# Patient Record
Sex: FEMALE | Race: WHITE | NOT HISPANIC OR LATINO | URBAN - METROPOLITAN AREA
[De-identification: names, ages, dates, MRNs, and addresses within clinical notes are randomized per-mention and may not be internally consistent; named-entity substitution may affect disease eponyms.]

---

## 2019-01-09 ENCOUNTER — INPATIENT (INPATIENT)
Facility: HOSPITAL | Age: 34
LOS: 3 days | Discharge: HOME | End: 2019-01-13
Attending: OBSTETRICS & GYNECOLOGY | Admitting: OBSTETRICS & GYNECOLOGY
Payer: COMMERCIAL

## 2019-01-09 VITALS — HEIGHT: 66 IN | WEIGHT: 166.89 LBS

## 2019-01-09 DIAGNOSIS — Z3A.00 WEEKS OF GESTATION OF PREGNANCY NOT SPECIFIED: ICD-10-CM

## 2019-01-09 DIAGNOSIS — O26.899 OTHER SPECIFIED PREGNANCY RELATED CONDITIONS, UNSPECIFIED TRIMESTER: ICD-10-CM

## 2019-01-09 LAB
ALBUMIN SERPL ELPH-MCNC: 3.6 G/DL — SIGNIFICANT CHANGE UP (ref 3.3–5)
ALP SERPL-CCNC: 88 U/L — SIGNIFICANT CHANGE UP (ref 40–120)
ALT FLD-CCNC: 35 U/L — SIGNIFICANT CHANGE UP (ref 10–45)
ANION GAP SERPL CALC-SCNC: 20 MMOL/L — HIGH (ref 5–17)
APPEARANCE UR: CLEAR — SIGNIFICANT CHANGE UP
APTT BLD: 24.8 SEC — LOW (ref 27.5–36.3)
AST SERPL-CCNC: 32 U/L — SIGNIFICANT CHANGE UP (ref 10–40)
BASOPHILS NFR BLD AUTO: 0.1 % — SIGNIFICANT CHANGE UP (ref 0–2)
BILIRUB SERPL-MCNC: 0.3 MG/DL — SIGNIFICANT CHANGE UP (ref 0.2–1.2)
BILIRUB UR-MCNC: NEGATIVE — SIGNIFICANT CHANGE UP
BLD GP AB SCN SERPL QL: POSITIVE — SIGNIFICANT CHANGE UP
BUN SERPL-MCNC: 8 MG/DL — SIGNIFICANT CHANGE UP (ref 7–23)
CALCIUM SERPL-MCNC: 8.7 MG/DL — SIGNIFICANT CHANGE UP (ref 8.4–10.5)
CHLORIDE SERPL-SCNC: 95 MMOL/L — LOW (ref 96–108)
CO2 SERPL-SCNC: 17 MMOL/L — LOW (ref 22–31)
COLOR SPEC: YELLOW — SIGNIFICANT CHANGE UP
CREAT ?TM UR-MCNC: 62 MG/DL — SIGNIFICANT CHANGE UP
CREAT SERPL-MCNC: 0.72 MG/DL — SIGNIFICANT CHANGE UP (ref 0.5–1.3)
DIFF PNL FLD: NEGATIVE — SIGNIFICANT CHANGE UP
EOSINOPHIL NFR BLD AUTO: 0.4 % — SIGNIFICANT CHANGE UP (ref 0–6)
FIBRINOGEN PPP-MCNC: 399 MG/DL — SIGNIFICANT CHANGE UP (ref 258–438)
GLUCOSE SERPL-MCNC: 73 MG/DL — SIGNIFICANT CHANGE UP (ref 70–99)
GLUCOSE UR QL: NEGATIVE — SIGNIFICANT CHANGE UP
HCT VFR BLD CALC: 38.4 % — SIGNIFICANT CHANGE UP (ref 34.5–45)
HGB BLD-MCNC: 13.3 G/DL — SIGNIFICANT CHANGE UP (ref 11.5–15.5)
INR BLD: 0.91 — SIGNIFICANT CHANGE UP (ref 0.88–1.16)
KETONES UR-MCNC: ABNORMAL MG/DL
LDH SERPL L TO P-CCNC: 237 U/L — SIGNIFICANT CHANGE UP (ref 50–242)
LEUKOCYTE ESTERASE UR-ACNC: ABNORMAL
LYMPHOCYTES # BLD AUTO: 16.6 % — SIGNIFICANT CHANGE UP (ref 13–44)
MCHC RBC-ENTMCNC: 32.3 PG — SIGNIFICANT CHANGE UP (ref 27–34)
MCHC RBC-ENTMCNC: 34.6 G/DL — SIGNIFICANT CHANGE UP (ref 32–36)
MCV RBC AUTO: 93.2 FL — SIGNIFICANT CHANGE UP (ref 80–100)
MONOCYTES NFR BLD AUTO: 7.3 % — SIGNIFICANT CHANGE UP (ref 2–14)
NEUTROPHILS NFR BLD AUTO: 75.6 % — SIGNIFICANT CHANGE UP (ref 43–77)
NITRITE UR-MCNC: NEGATIVE — SIGNIFICANT CHANGE UP
NT-PROBNP SERPL-SCNC: 17 PG/ML — SIGNIFICANT CHANGE UP (ref 0–300)
PH UR: 6.5 — SIGNIFICANT CHANGE UP (ref 5–8)
PLATELET # BLD AUTO: 193 K/UL — SIGNIFICANT CHANGE UP (ref 150–400)
POTASSIUM SERPL-MCNC: 3.4 MMOL/L — LOW (ref 3.5–5.3)
POTASSIUM SERPL-SCNC: 3.4 MMOL/L — LOW (ref 3.5–5.3)
PROT ?TM UR-MCNC: 5 MG/DL — SIGNIFICANT CHANGE UP (ref 0–12)
PROT SERPL-MCNC: 6.5 G/DL — SIGNIFICANT CHANGE UP (ref 6–8.3)
PROT UR-MCNC: NEGATIVE MG/DL — SIGNIFICANT CHANGE UP
PROT/CREAT UR-RTO: 0.1 RATIO — SIGNIFICANT CHANGE UP (ref 0–0.2)
PROTHROM AB SERPL-ACNC: 10.3 SEC — SIGNIFICANT CHANGE UP (ref 10–12.9)
RBC # BLD: 4.12 M/UL — SIGNIFICANT CHANGE UP (ref 3.8–5.2)
RBC # FLD: 12.7 % — SIGNIFICANT CHANGE UP (ref 10.3–16.9)
RH IG SCN BLD-IMP: NEGATIVE — SIGNIFICANT CHANGE UP
RH IG SCN BLD-IMP: NEGATIVE — SIGNIFICANT CHANGE UP
SODIUM SERPL-SCNC: 132 MMOL/L — LOW (ref 135–145)
SP GR SPEC: <=1.005 — SIGNIFICANT CHANGE UP (ref 1–1.03)
URATE SERPL-MCNC: 5.7 MG/DL — SIGNIFICANT CHANGE UP (ref 2.5–7)
UROBILINOGEN FLD QL: 0.2 E.U./DL — SIGNIFICANT CHANGE UP
WBC # BLD: 13.4 K/UL — HIGH (ref 3.8–10.5)
WBC # FLD AUTO: 13.4 K/UL — HIGH (ref 3.8–10.5)

## 2019-01-09 PROCEDURE — 86077 PHYS BLOOD BANK SERV XMATCH: CPT

## 2019-01-09 RX ORDER — SODIUM CHLORIDE 9 MG/ML
1000 INJECTION, SOLUTION INTRAVENOUS ONCE
Qty: 0 | Refills: 0 | Status: DISCONTINUED | OUTPATIENT
Start: 2019-01-09 | End: 2019-01-11

## 2019-01-09 RX ORDER — FENTANYL/BUPIVACAINE/NS/PF 2MCG/ML-.1
250 PLASTIC BAG, INJECTION (ML) INJECTION
Qty: 0 | Refills: 0 | Status: DISCONTINUED | OUTPATIENT
Start: 2019-01-09 | End: 2019-01-11

## 2019-01-09 RX ORDER — SODIUM CHLORIDE 9 MG/ML
1000 INJECTION, SOLUTION INTRAVENOUS
Qty: 0 | Refills: 0 | Status: COMPLETED | OUTPATIENT
Start: 2019-01-09 | End: 2019-01-10

## 2019-01-09 RX ORDER — OXYTOCIN 10 UNIT/ML
333.33 VIAL (ML) INJECTION
Qty: 20 | Refills: 0 | Status: DISCONTINUED | OUTPATIENT
Start: 2019-01-09 | End: 2019-01-11

## 2019-01-09 RX ORDER — DINOPROSTONE 10 MG/241MG
10 INSERT VAGINAL ONCE
Qty: 0 | Refills: 0 | Status: COMPLETED | OUTPATIENT
Start: 2019-01-09 | End: 2019-01-09

## 2019-01-09 RX ADMIN — SODIUM CHLORIDE 125 MILLILITER(S): 9 INJECTION, SOLUTION INTRAVENOUS at 15:06

## 2019-01-09 RX ADMIN — DINOPROSTONE 10 MILLIGRAM(S): 10 INSERT VAGINAL at 15:49

## 2019-01-10 LAB
ALBUMIN SERPL ELPH-MCNC: 3.2 G/DL — LOW (ref 3.3–5)
ALP SERPL-CCNC: 81 U/L — SIGNIFICANT CHANGE UP (ref 40–120)
ALT FLD-CCNC: 27 U/L — SIGNIFICANT CHANGE UP (ref 10–45)
ANION GAP SERPL CALC-SCNC: 18 MMOL/L — HIGH (ref 5–17)
AST SERPL-CCNC: 31 U/L — SIGNIFICANT CHANGE UP (ref 10–40)
BILIRUB SERPL-MCNC: 0.6 MG/DL — SIGNIFICANT CHANGE UP (ref 0.2–1.2)
BUN SERPL-MCNC: 6 MG/DL — LOW (ref 7–23)
CALCIUM SERPL-MCNC: 8.7 MG/DL — SIGNIFICANT CHANGE UP (ref 8.4–10.5)
CHLORIDE SERPL-SCNC: 93 MMOL/L — LOW (ref 96–108)
CO2 SERPL-SCNC: 19 MMOL/L — LOW (ref 22–31)
CREAT SERPL-MCNC: 0.72 MG/DL — SIGNIFICANT CHANGE UP (ref 0.5–1.3)
GLUCOSE SERPL-MCNC: 80 MG/DL — SIGNIFICANT CHANGE UP (ref 70–99)
HCT VFR BLD CALC: 36.1 % — SIGNIFICANT CHANGE UP (ref 34.5–45)
HGB BLD-MCNC: 12.9 G/DL — SIGNIFICANT CHANGE UP (ref 11.5–15.5)
MCHC RBC-ENTMCNC: 33.2 PG — SIGNIFICANT CHANGE UP (ref 27–34)
MCHC RBC-ENTMCNC: 35.7 G/DL — SIGNIFICANT CHANGE UP (ref 32–36)
MCV RBC AUTO: 93 FL — SIGNIFICANT CHANGE UP (ref 80–100)
NT-PROBNP SERPL-SCNC: 52 PG/ML — SIGNIFICANT CHANGE UP (ref 0–300)
PLATELET # BLD AUTO: 184 K/UL — SIGNIFICANT CHANGE UP (ref 150–400)
POTASSIUM SERPL-MCNC: 3.5 MMOL/L — SIGNIFICANT CHANGE UP (ref 3.5–5.3)
POTASSIUM SERPL-SCNC: 3.5 MMOL/L — SIGNIFICANT CHANGE UP (ref 3.5–5.3)
PROT SERPL-MCNC: 5.9 G/DL — LOW (ref 6–8.3)
RBC # BLD: 3.88 M/UL — SIGNIFICANT CHANGE UP (ref 3.8–5.2)
RBC # FLD: 12.7 % — SIGNIFICANT CHANGE UP (ref 10.3–16.9)
SODIUM SERPL-SCNC: 130 MMOL/L — LOW (ref 135–145)
T PALLIDUM AB TITR SER: NEGATIVE — SIGNIFICANT CHANGE UP
URATE SERPL-MCNC: 5 MG/DL — SIGNIFICANT CHANGE UP (ref 2.5–7)
WBC # BLD: 18.5 K/UL — HIGH (ref 3.8–10.5)
WBC # FLD AUTO: 18.5 K/UL — HIGH (ref 3.8–10.5)

## 2019-01-10 RX ORDER — OXYTOCIN 10 UNIT/ML
1 VIAL (ML) INJECTION
Qty: 30 | Refills: 0 | Status: DISCONTINUED | OUTPATIENT
Start: 2019-01-10 | End: 2019-01-11

## 2019-01-10 RX ADMIN — Medication 1 MILLIUNIT(S)/MIN: at 09:36

## 2019-01-10 RX ADMIN — SODIUM CHLORIDE 125 MILLILITER(S): 9 INJECTION, SOLUTION INTRAVENOUS at 20:10

## 2019-01-10 RX ADMIN — SODIUM CHLORIDE 125 MILLILITER(S): 9 INJECTION, SOLUTION INTRAVENOUS at 11:14

## 2019-01-11 ENCOUNTER — RESULT REVIEW (OUTPATIENT)
Age: 34
End: 2019-01-11

## 2019-01-11 ENCOUNTER — TRANSCRIPTION ENCOUNTER (OUTPATIENT)
Age: 34
End: 2019-01-11

## 2019-01-11 LAB
ANION GAP SERPL CALC-SCNC: 15 MMOL/L — SIGNIFICANT CHANGE UP (ref 5–17)
BLD GP AB SCN SERPL QL: POSITIVE — SIGNIFICANT CHANGE UP
BUN SERPL-MCNC: 5 MG/DL — LOW (ref 7–23)
CALCIUM SERPL-MCNC: 8.4 MG/DL — SIGNIFICANT CHANGE UP (ref 8.4–10.5)
CHLORIDE SERPL-SCNC: 96 MMOL/L — SIGNIFICANT CHANGE UP (ref 96–108)
CO2 SERPL-SCNC: 21 MMOL/L — LOW (ref 22–31)
CREAT SERPL-MCNC: 0.76 MG/DL — SIGNIFICANT CHANGE UP (ref 0.5–1.3)
GLUCOSE SERPL-MCNC: 119 MG/DL — HIGH (ref 70–99)
NT-PROBNP SERPL-SCNC: 284 PG/ML — SIGNIFICANT CHANGE UP (ref 0–300)
POTASSIUM SERPL-MCNC: 3.9 MMOL/L — SIGNIFICANT CHANGE UP (ref 3.5–5.3)
POTASSIUM SERPL-SCNC: 3.9 MMOL/L — SIGNIFICANT CHANGE UP (ref 3.5–5.3)
RH IG SCN BLD-IMP: NEGATIVE — SIGNIFICANT CHANGE UP
SODIUM SERPL-SCNC: 132 MMOL/L — LOW (ref 135–145)

## 2019-01-11 RX ORDER — PRAMOXINE HYDROCHLORIDE 150 MG/15G
1 AEROSOL, FOAM RECTAL EVERY 4 HOURS
Qty: 0 | Refills: 0 | Status: DISCONTINUED | OUTPATIENT
Start: 2019-01-11 | End: 2019-01-13

## 2019-01-11 RX ORDER — OXYCODONE AND ACETAMINOPHEN 5; 325 MG/1; MG/1
2 TABLET ORAL EVERY 6 HOURS
Qty: 0 | Refills: 0 | Status: DISCONTINUED | OUTPATIENT
Start: 2019-01-11 | End: 2019-01-13

## 2019-01-11 RX ORDER — AER TRAVELER 0.5 G/1
1 SOLUTION RECTAL; TOPICAL EVERY 4 HOURS
Qty: 0 | Refills: 0 | Status: DISCONTINUED | OUTPATIENT
Start: 2019-01-11 | End: 2019-01-13

## 2019-01-11 RX ORDER — SODIUM CHLORIDE 9 MG/ML
1000 INJECTION, SOLUTION INTRAVENOUS
Qty: 0 | Refills: 0 | Status: DISCONTINUED | OUTPATIENT
Start: 2019-01-11 | End: 2019-01-11

## 2019-01-11 RX ORDER — MAGNESIUM HYDROXIDE 400 MG/1
30 TABLET, CHEWABLE ORAL
Qty: 0 | Refills: 0 | Status: DISCONTINUED | OUTPATIENT
Start: 2019-01-11 | End: 2019-01-13

## 2019-01-11 RX ORDER — SODIUM CHLORIDE 9 MG/ML
1000 INJECTION INTRAMUSCULAR; INTRAVENOUS; SUBCUTANEOUS
Qty: 0 | Refills: 0 | Status: DISCONTINUED | OUTPATIENT
Start: 2019-01-11 | End: 2019-01-11

## 2019-01-11 RX ORDER — DIPHENHYDRAMINE HCL 50 MG
25 CAPSULE ORAL EVERY 6 HOURS
Qty: 0 | Refills: 0 | Status: DISCONTINUED | OUTPATIENT
Start: 2019-01-11 | End: 2019-01-13

## 2019-01-11 RX ORDER — DIBUCAINE 1 %
1 OINTMENT (GRAM) RECTAL EVERY 4 HOURS
Qty: 0 | Refills: 0 | Status: DISCONTINUED | OUTPATIENT
Start: 2019-01-11 | End: 2019-01-13

## 2019-01-11 RX ORDER — GLYCERIN ADULT
1 SUPPOSITORY, RECTAL RECTAL AT BEDTIME
Qty: 0 | Refills: 0 | Status: DISCONTINUED | OUTPATIENT
Start: 2019-01-11 | End: 2019-01-13

## 2019-01-11 RX ORDER — OXYCODONE AND ACETAMINOPHEN 5; 325 MG/1; MG/1
1 TABLET ORAL
Qty: 0 | Refills: 0 | Status: DISCONTINUED | OUTPATIENT
Start: 2019-01-11 | End: 2019-01-13

## 2019-01-11 RX ORDER — LEVOTHYROXINE SODIUM 125 MCG
1 TABLET ORAL
Qty: 0 | Refills: 0 | COMMUNITY

## 2019-01-11 RX ORDER — ACETAMINOPHEN 500 MG
650 TABLET ORAL EVERY 6 HOURS
Qty: 0 | Refills: 0 | Status: DISCONTINUED | OUTPATIENT
Start: 2019-01-11 | End: 2019-01-13

## 2019-01-11 RX ORDER — CLONAZEPAM 1 MG
0.25 TABLET ORAL
Qty: 0 | Refills: 0 | COMMUNITY

## 2019-01-11 RX ORDER — ONDANSETRON 8 MG/1
4 TABLET, FILM COATED ORAL ONCE
Qty: 0 | Refills: 0 | Status: COMPLETED | OUTPATIENT
Start: 2019-01-10 | End: 2019-01-11

## 2019-01-11 RX ORDER — LANOLIN
1 OINTMENT (GRAM) TOPICAL EVERY 6 HOURS
Qty: 0 | Refills: 0 | Status: DISCONTINUED | OUTPATIENT
Start: 2019-01-11 | End: 2019-01-13

## 2019-01-11 RX ORDER — SIMETHICONE 80 MG/1
80 TABLET, CHEWABLE ORAL EVERY 6 HOURS
Qty: 0 | Refills: 0 | Status: DISCONTINUED | OUTPATIENT
Start: 2019-01-11 | End: 2019-01-13

## 2019-01-11 RX ORDER — SODIUM CHLORIDE 9 MG/ML
3 INJECTION INTRAMUSCULAR; INTRAVENOUS; SUBCUTANEOUS EVERY 8 HOURS
Qty: 0 | Refills: 0 | Status: DISCONTINUED | OUTPATIENT
Start: 2019-01-11 | End: 2019-01-13

## 2019-01-11 RX ORDER — OXYTOCIN 10 UNIT/ML
41.67 VIAL (ML) INJECTION
Qty: 20 | Refills: 0 | Status: DISCONTINUED | OUTPATIENT
Start: 2019-01-11 | End: 2019-01-13

## 2019-01-11 RX ORDER — BENZOCAINE 10 %
1 GEL (GRAM) MUCOUS MEMBRANE EVERY 6 HOURS
Qty: 0 | Refills: 0 | Status: DISCONTINUED | OUTPATIENT
Start: 2019-01-11 | End: 2019-01-13

## 2019-01-11 RX ORDER — HYDROCORTISONE 1 %
1 OINTMENT (GRAM) TOPICAL EVERY 4 HOURS
Qty: 0 | Refills: 0 | Status: DISCONTINUED | OUTPATIENT
Start: 2019-01-11 | End: 2019-01-13

## 2019-01-11 RX ORDER — DOCUSATE SODIUM 100 MG
100 CAPSULE ORAL
Qty: 0 | Refills: 0 | Status: DISCONTINUED | OUTPATIENT
Start: 2019-01-11 | End: 2019-01-13

## 2019-01-11 RX ORDER — IBUPROFEN 200 MG
600 TABLET ORAL EVERY 6 HOURS
Qty: 0 | Refills: 0 | Status: DISCONTINUED | OUTPATIENT
Start: 2019-01-11 | End: 2019-01-13

## 2019-01-11 RX ORDER — TETANUS TOXOID, REDUCED DIPHTHERIA TOXOID AND ACELLULAR PERTUSSIS VACCINE, ADSORBED 5; 2.5; 8; 8; 2.5 [IU]/.5ML; [IU]/.5ML; UG/.5ML; UG/.5ML; UG/.5ML
0.5 SUSPENSION INTRAMUSCULAR ONCE
Qty: 0 | Refills: 0 | Status: DISCONTINUED | OUTPATIENT
Start: 2019-01-11 | End: 2019-01-13

## 2019-01-11 RX ADMIN — Medication 600 MILLIGRAM(S): at 21:30

## 2019-01-11 RX ADMIN — ONDANSETRON 4 MILLIGRAM(S): 8 TABLET, FILM COATED ORAL at 00:27

## 2019-01-11 RX ADMIN — Medication 100 MILLIGRAM(S): at 21:01

## 2019-01-11 RX ADMIN — Medication 1 APPLICATION(S): at 22:17

## 2019-01-11 RX ADMIN — Medication 250 MILLILITER(S): at 12:20

## 2019-01-11 RX ADMIN — SODIUM CHLORIDE 125 MILLILITER(S): 9 INJECTION INTRAMUSCULAR; INTRAVENOUS; SUBCUTANEOUS at 09:33

## 2019-01-11 RX ADMIN — SODIUM CHLORIDE 3 MILLILITER(S): 9 INJECTION INTRAMUSCULAR; INTRAVENOUS; SUBCUTANEOUS at 22:00

## 2019-01-11 RX ADMIN — Medication 125 MILLIUNIT(S)/MIN: at 15:15

## 2019-01-11 RX ADMIN — SODIUM CHLORIDE 125 MILLILITER(S): 9 INJECTION, SOLUTION INTRAVENOUS at 00:30

## 2019-01-11 RX ADMIN — Medication 600 MILLIGRAM(S): at 20:54

## 2019-01-11 NOTE — DISCHARGE NOTE OB - PLAN OF CARE
discharge home Patient to be discharged home. Nothing per vagina, no tampons, tub baths, intercourse. patient to notify provider for fever >101, heavy vaginal bleeding, extreme abdominal pain. patient to follow up in office in 2w for blood pressure check

## 2019-01-11 NOTE — DISCHARGE NOTE OB - PATIENT PORTAL LINK FT
You can access the [facility] Patient Portal, offered by [facility], by registering with the following website: [email or web address]

## 2019-01-11 NOTE — DISCHARGE NOTE OB - CARE PLAN
Principal Discharge DX:	Postpartum state  Goal:	discharge home  Assessment and plan of treatment:	Patient to be discharged home. Nothing per vagina, no tampons, tub baths, intercourse. patient to notify provider for fever >101, heavy vaginal bleeding, extreme abdominal pain. patient to follow up in office in 2w for blood pressure check

## 2019-01-11 NOTE — PROVIDER CONTACT NOTE (CHANGE IN STATUS NOTIFICATION) - SITUATION
pt trickling blood upon feeling for uterus; fundus is deviated to right +2; small clot passed when taken to bathroom

## 2019-01-11 NOTE — PROGRESS NOTE ADULT - SUBJECTIVE AND OBJECTIVE BOX
patient seen at bedside for "trickling." Patient's blum was removed 2 hours ago and she hasn't voided yet.  Uterus is deviated to the right, but is firm. No blood expressed. Harmony wnl. f/u  TOV.

## 2019-01-11 NOTE — DISCHARGE NOTE OB - CARE PROVIDER_API CALL
Neema Camacho (MD), Obstetrics and Gynecology  [address] [place] [address]  Phone: [phone]  Fax: [phone]

## 2019-01-12 RX ADMIN — Medication 100 MILLIGRAM(S): at 12:36

## 2019-01-12 RX ADMIN — Medication 600 MILLIGRAM(S): at 02:28

## 2019-01-12 RX ADMIN — Medication 600 MILLIGRAM(S): at 18:30

## 2019-01-12 RX ADMIN — Medication 600 MILLIGRAM(S): at 13:10

## 2019-01-12 RX ADMIN — SODIUM CHLORIDE 3 MILLILITER(S): 9 INJECTION INTRAMUSCULAR; INTRAVENOUS; SUBCUTANEOUS at 05:29

## 2019-01-12 RX ADMIN — Medication 600 MILLIGRAM(S): at 23:27

## 2019-01-12 RX ADMIN — Medication 1 TABLET(S): at 12:35

## 2019-01-12 RX ADMIN — Medication 600 MILLIGRAM(S): at 17:44

## 2019-01-12 RX ADMIN — Medication 600 MILLIGRAM(S): at 03:00

## 2019-01-12 RX ADMIN — Medication 600 MILLIGRAM(S): at 12:36

## 2019-01-12 NOTE — LACTATION INITIAL EVALUATION - AS DELIV COMPLICATIONS OB
pre eclampsia/abnormal fetal heart rate tracing/RDS Cpap respirations non-labored/no intercostal retractions/airway patent/no wheezes/good air movement/breath sounds equal/no subcutaneous emphysema/no chest wall tenderness/no rhonchi/no rales/clear to auscultation bilaterally

## 2019-01-12 NOTE — PROGRESS NOTE ADULT - SUBJECTIVE AND OBJECTIVE BOX
Patient evaluated in NCCU, baby admitted for desaturations and monitoring  She reports pain is well controlled with po pain meds  She denies headache, dizziness, chest pain, palpitations, shortness of breathe, nausea, vomiting, heavy vaginal bleeding or perineal discomfort.  She has been ambulating without assistance, voiding spontaneously, and pumping    Physical Exam:  Vital Signs Last 24 Hrs  T(C): 36.4 (12 Jan 2019 02:00), Max: 37.2 (11 Jan 2019 14:45)  T(F): 97.5 (12 Jan 2019 02:00), Max: 99 (11 Jan 2019 14:45)  HR: 66 (12 Jan 2019 02:00) (66 - 105)  BP: 112/73 (12 Jan 2019 02:00) (108/61 - 124/64)  BP(mean): --  RR: 18 (12 Jan 2019 02:00) (14 - 20)  SpO2: 99% (12 Jan 2019 02:00) (99% - 99%)    GA: NAD, A+0 x 3  CV: RRR  Pulm: CTAB  Breasts: soft, nontender, no palpable masses  Abd: + BS, soft, nontender, nondistended, no rebound or guarding, uterus firm at midline,  2 fb below umbilicus  : lochia WNL  Extremities: no swelling or calf tenderness, reflexes +2 bilaterally                          12.9   18.5  )-----------( 184      ( 10 Ronnie 2019 18:28 )             36.1     01-11    132<L>  |  96  |  5<L>  ----------------------------<  119<H>  3.9   |  21<L>  |  0.76    Ca    8.4      11 Jan 2019 16:16    TPro  5.9<L>  /  Alb  3.2<L>  /  TBili  0.6  /  DBili  x   /  AST  31  /  ALT  27  /  AlkPhos  81  01-10

## 2019-01-12 NOTE — PROGRESS NOTE ADULT - ASSESSMENT
A/P 33y PPD#1 s/p , stable  1. Pain: well controlled on OPM  2. GI: Regular diet  3. BP wnl  4. : voiding  5. DVT prophylaxis: ambulate  6. Dispo: PPD 2, unless otherwise specified

## 2019-01-12 NOTE — LACTATION INITIAL EVALUATION - INFANT FEEDING PLAN COMMENT, OB PROFILE
Mom wishes to exclusively B/F her baby. She is pumping to secure her milk supply. I will provide assistance when infant is able to go to the breast

## 2019-01-13 VITALS
TEMPERATURE: 98 F | HEART RATE: 69 BPM | OXYGEN SATURATION: 100 % | RESPIRATION RATE: 17 BRPM | DIASTOLIC BLOOD PRESSURE: 84 MMHG | SYSTOLIC BLOOD PRESSURE: 130 MMHG

## 2019-01-13 PROCEDURE — 86780 TREPONEMA PALLIDUM: CPT

## 2019-01-13 PROCEDURE — 85384 FIBRINOGEN ACTIVITY: CPT

## 2019-01-13 PROCEDURE — 88307 TISSUE EXAM BY PATHOLOGIST: CPT

## 2019-01-13 PROCEDURE — 84156 ASSAY OF PROTEIN URINE: CPT

## 2019-01-13 PROCEDURE — 82570 ASSAY OF URINE CREATININE: CPT

## 2019-01-13 PROCEDURE — 86900 BLOOD TYPING SEROLOGIC ABO: CPT

## 2019-01-13 PROCEDURE — 81001 URINALYSIS AUTO W/SCOPE: CPT

## 2019-01-13 PROCEDURE — 36415 COLL VENOUS BLD VENIPUNCTURE: CPT

## 2019-01-13 PROCEDURE — 86921 COMPATIBILITY TEST INCUBATE: CPT

## 2019-01-13 PROCEDURE — 85025 COMPLETE CBC W/AUTO DIFF WBC: CPT

## 2019-01-13 PROCEDURE — 80048 BASIC METABOLIC PNL TOTAL CA: CPT

## 2019-01-13 PROCEDURE — 83880 ASSAY OF NATRIURETIC PEPTIDE: CPT

## 2019-01-13 PROCEDURE — 80053 COMPREHEN METABOLIC PANEL: CPT

## 2019-01-13 PROCEDURE — 99214 OFFICE O/P EST MOD 30 MIN: CPT

## 2019-01-13 PROCEDURE — 83615 LACTATE (LD) (LDH) ENZYME: CPT

## 2019-01-13 PROCEDURE — 84550 ASSAY OF BLOOD/URIC ACID: CPT

## 2019-01-13 PROCEDURE — 86922 COMPATIBILITY TEST ANTIGLOB: CPT

## 2019-01-13 PROCEDURE — 86880 COOMBS TEST DIRECT: CPT

## 2019-01-13 PROCEDURE — 86850 RBC ANTIBODY SCREEN: CPT

## 2019-01-13 PROCEDURE — 85610 PROTHROMBIN TIME: CPT

## 2019-01-13 PROCEDURE — 85730 THROMBOPLASTIN TIME PARTIAL: CPT

## 2019-01-13 PROCEDURE — 86870 RBC ANTIBODY IDENTIFICATION: CPT

## 2019-01-13 PROCEDURE — 85027 COMPLETE CBC AUTOMATED: CPT

## 2019-01-13 PROCEDURE — 86901 BLOOD TYPING SEROLOGIC RH(D): CPT

## 2019-01-13 RX ADMIN — Medication 1 APPLICATION(S): at 12:48

## 2019-01-13 RX ADMIN — Medication 600 MILLIGRAM(S): at 06:23

## 2019-01-13 RX ADMIN — Medication 600 MILLIGRAM(S): at 07:15

## 2019-01-13 RX ADMIN — Medication 600 MILLIGRAM(S): at 12:44

## 2019-01-13 RX ADMIN — Medication 1 TABLET(S): at 12:43

## 2019-01-13 RX ADMIN — Medication 600 MILLIGRAM(S): at 13:57

## 2019-01-13 RX ADMIN — Medication 600 MILLIGRAM(S): at 00:09

## 2019-01-13 NOTE — PROGRESS NOTE ADULT - SUBJECTIVE AND OBJECTIVE BOX
Patient not in room at time of rounding.    Vital Signs Last 24 Hrs  T(C): 36.6 (13 Jan 2019 06:00), Max: 36.6 (13 Jan 2019 06:00)  T(F): 97.9 (13 Jan 2019 06:00), Max: 97.9 (13 Jan 2019 06:00)  HR: 64 (13 Jan 2019 06:29) (61 - 69)  BP: 134/88 (13 Jan 2019 06:29) (124/79 - 148/82)  BP(mean): --  RR: 17 (13 Jan 2019 06:29) (17 - 18)  SpO2: 100% (13 Jan 2019 06:29) (98% - 100%)

## 2019-01-16 DIAGNOSIS — Z3A.40 40 WEEKS GESTATION OF PREGNANCY: ICD-10-CM

## 2019-01-31 LAB — SURGICAL PATHOLOGY STUDY: SIGNIFICANT CHANGE UP

## 2019-04-22 PROBLEM — Z00.00 ENCOUNTER FOR PREVENTIVE HEALTH EXAMINATION: Status: ACTIVE | Noted: 2019-04-22

## 2019-04-23 ENCOUNTER — APPOINTMENT (OUTPATIENT)
Dept: UROGYNECOLOGY | Facility: CLINIC | Age: 34
End: 2019-04-23
Payer: COMMERCIAL

## 2019-04-23 VITALS
WEIGHT: 135 LBS | HEIGHT: 66 IN | SYSTOLIC BLOOD PRESSURE: 102 MMHG | BODY MASS INDEX: 21.69 KG/M2 | DIASTOLIC BLOOD PRESSURE: 70 MMHG

## 2019-04-23 DIAGNOSIS — Z83.42 FAMILY HISTORY OF FAMILIAL HYPERCHOLESTEROLEMIA: ICD-10-CM

## 2019-04-23 DIAGNOSIS — N89.5 STRICTURE AND ATRESIA OF VAGINA: ICD-10-CM

## 2019-04-23 DIAGNOSIS — N95.2 POSTMENOPAUSAL ATROPHIC VAGINITIS: ICD-10-CM

## 2019-04-23 DIAGNOSIS — Z78.9 OTHER SPECIFIED HEALTH STATUS: ICD-10-CM

## 2019-04-23 DIAGNOSIS — Z80.42 FAMILY HISTORY OF MALIGNANT NEOPLASM OF PROSTATE: ICD-10-CM

## 2019-04-23 PROCEDURE — 99244 OFF/OP CNSLTJ NEW/EST MOD 40: CPT

## 2019-04-23 RX ORDER — ESTRADIOL 0.1 MG/G
0.1 CREAM VAGINAL
Qty: 1 | Refills: 2 | Status: ACTIVE | COMMUNITY
Start: 2019-04-23 | End: 1900-01-01

## 2019-04-23 NOTE — HISTORY OF PRESENT ILLNESS
[FreeTextEntry1] : \gonzález Donohue presents with "vaginal stenosis" s/p  14 weeks ago. She reports inability to have sexual intercourse. She denies pain or bleeding. She denies any urinary symptoms. She is able to void with a normal stream and denies obstruction. She reports having a minor tear which required sutures after delivery, no other complications. She was seen by her OB who noted vaginal adhesions by introitus. She was able to insert speculum however this was painful. She is currently breastfeeding.

## 2019-04-23 NOTE — DISCUSSION/SUMMARY
[FreeTextEntry1] : \par We reviewed treatment options including both nonsurgical and surgical options. She desires surgical revision. Will excise band and perform colporrhaphy to revise prior repair. Recommend she start low dose vaginal estrogen due to atrophy, as patient is breastfeeding. After procedure, she will continue vaginal estrogen and use dilators to prevent scar reformation. We will proceed with surgical scheduling. We discussed preop and postop instructions and restrictions. All questions were answered.

## 2019-04-23 NOTE — LETTER BODY
[Dear  ___] : Dear  [unfilled], [Attached please find my note.] : Attached please find my note. [Thank you very much for allowing me to participate in the care of this patient. If you have any questions, please do not hesitate to contact me] : Thank you very much for allowing me to participate in the care of this patient. If you have any questions, please do not hesitate to contact me. [I had the pleasure of evaluating your patient, [unfilled]. Thank you for referring Ms. [unfilled] for consultation for ___] : I had the pleasure of evaluating your patient, [unfilled]. Thank you for referring Ms. [unfilled] for consultation for [unfilled].

## 2019-04-23 NOTE — PROCEDURE
Service to Dermatology referral and demographic info faxed to Dr Edmond at [phone]. Fax confirmation received.    [FreeTextEntry1] : Lidocaine gel applied and left in place for 15 min. Attempted to manually separate adhesion. Due to density of band, unable to separate manually.

## 2019-04-23 NOTE — PHYSICAL EXAM
[No Acute Distress] : in no acute distress [Oriented x3] : oriented to person, place, and time [Normal Memory] : ~T memory was ~L unimpaired [Normal Mood/Affect] : mood and affect are normal [Normal Gait] : gait was normal [Warm and Dry] : was warm and dry to touch [Normal] : normal [Tenderness] : ~T no ~M abdominal tenderness observed [Distended] : not distended [Inguinal LAD] : no adenopathy was noted in the inguinal lymph nodes [H/Smegaly] : no hepatosplenomegaly [Atrophy] : atrophy [No Bleeding] : there was no active vaginal bleeding [de-identified] : dense adhesion/band present posteriorly at introitus, at site of vaginal laceration repair.  Band obsructing vaginal opening. Band from perineal body over opening. Urethra unobstructed.

## 2019-04-24 PROBLEM — Z78.9 NON-SMOKER: Status: ACTIVE | Noted: 2019-04-24

## 2019-04-24 PROBLEM — Z83.42 FAMILY HISTORY OF HYPERCHOLESTEROLEMIA: Status: ACTIVE | Noted: 2019-04-24

## 2019-04-24 PROBLEM — Z78.9 SOCIAL ALCOHOL USE: Status: ACTIVE | Noted: 2019-04-24

## 2019-04-24 PROBLEM — Z80.42 FAMILY HISTORY OF MALIGNANT NEOPLASM OF PROSTATE: Status: ACTIVE | Noted: 2019-04-24

## 2019-04-29 ENCOUNTER — OUTPATIENT (OUTPATIENT)
Dept: OUTPATIENT SERVICES | Facility: HOSPITAL | Age: 34
LOS: 1 days | End: 2019-04-29
Payer: COMMERCIAL

## 2019-04-29 VITALS
OXYGEN SATURATION: 99 % | HEIGHT: 66 IN | TEMPERATURE: 99 F | DIASTOLIC BLOOD PRESSURE: 80 MMHG | WEIGHT: 136.03 LBS | RESPIRATION RATE: 14 BRPM | SYSTOLIC BLOOD PRESSURE: 120 MMHG | HEART RATE: 61 BPM

## 2019-04-29 DIAGNOSIS — N89.5 STRICTURE AND ATRESIA OF VAGINA: ICD-10-CM

## 2019-04-29 DIAGNOSIS — Z78.9 OTHER SPECIFIED HEALTH STATUS: ICD-10-CM

## 2019-04-29 DIAGNOSIS — Z98.890 OTHER SPECIFIED POSTPROCEDURAL STATES: Chronic | ICD-10-CM

## 2019-04-29 DIAGNOSIS — Z01.818 ENCOUNTER FOR OTHER PREPROCEDURAL EXAMINATION: ICD-10-CM

## 2019-04-29 DIAGNOSIS — K08.409 PARTIAL LOSS OF TEETH, UNSPECIFIED CAUSE, UNSPECIFIED CLASS: Chronic | ICD-10-CM

## 2019-04-29 LAB
HCT VFR BLD CALC: 40 % — SIGNIFICANT CHANGE UP (ref 34.5–45)
HGB BLD-MCNC: 13.6 G/DL — SIGNIFICANT CHANGE UP (ref 11.5–15.5)
MCHC RBC-ENTMCNC: 31.1 PG — SIGNIFICANT CHANGE UP (ref 27–34)
MCHC RBC-ENTMCNC: 34 GM/DL — SIGNIFICANT CHANGE UP (ref 32–36)
MCV RBC AUTO: 91.5 FL — SIGNIFICANT CHANGE UP (ref 80–100)
PLATELET # BLD AUTO: 245 K/UL — SIGNIFICANT CHANGE UP (ref 150–400)
RBC # BLD: 4.37 M/UL — SIGNIFICANT CHANGE UP (ref 3.8–5.2)
RBC # FLD: 12.5 % — SIGNIFICANT CHANGE UP (ref 10.3–14.5)
WBC # BLD: 4.68 K/UL — SIGNIFICANT CHANGE UP (ref 3.8–10.5)
WBC # FLD AUTO: 4.68 K/UL — SIGNIFICANT CHANGE UP (ref 3.8–10.5)

## 2019-04-29 PROCEDURE — 85027 COMPLETE CBC AUTOMATED: CPT

## 2019-04-29 PROCEDURE — 87086 URINE CULTURE/COLONY COUNT: CPT

## 2019-04-29 PROCEDURE — G0463: CPT

## 2019-04-29 RX ORDER — SODIUM CHLORIDE 9 MG/ML
3 INJECTION INTRAMUSCULAR; INTRAVENOUS; SUBCUTANEOUS EVERY 8 HOURS
Qty: 0 | Refills: 0 | Status: DISCONTINUED | OUTPATIENT
Start: 2019-05-02 | End: 2019-05-17

## 2019-04-29 RX ORDER — LIDOCAINE HCL 20 MG/ML
0.2 VIAL (ML) INJECTION ONCE
Qty: 0 | Refills: 0 | Status: DISCONTINUED | OUTPATIENT
Start: 2019-05-02 | End: 2019-05-17

## 2019-04-29 NOTE — H&P PST ADULT - NSICDXPROBLEM_GEN_ALL_CORE_FT
PROBLEM DIAGNOSES  Problem: Breastfeeding (infant)  Assessment and Plan: pt is currently breastfeeding, advised pump and dump 24h after surgery  s/w anesthesiologist    Problem: Stricture and atresia of vagina  Assessment and Plan: suture of injury of vagina

## 2019-04-29 NOTE — H&P PST ADULT - NSANTHOSAYNRD_GEN_A_CORE
No. AMAURI screening performed.  STOP BANG Legend: 0-2 = LOW Risk; 3-4 = INTERMEDIATE Risk; 5-8 = HIGH Risk

## 2019-04-29 NOTE — H&P PST ADULT - HISTORY OF PRESENT ILLNESS
History of Present Illness    Jayde presents with "vaginal stenosis" s/p  14 weeks ago. She reports inability to have sexual intercourse. She denies pain or bleeding. She denies any urinary symptoms. She is able to void with a normal stream and denies obstruction. She reports having a minor tear which required sutures after delivery, no other complications. She was seen by her OB who noted vaginal adhesions by introitus. She was able to insert speculum however this was painful. She is currently breastfeeding. 34 yo female c/o "vaginal stenosis" s/p   on 2019, 15 weeks postpartum. reports inability to have coitus due to "scarring",  denies pain or bleeding.  evaluated by her OB who noted vaginal adhesions by introitus. She is currently breastfeeding. now presents to PST scheduled for suture of injury of vagina.  ***s/w Leyda from Dr. Howell's office who requested PST to collect urine culture today.***

## 2019-04-30 LAB
CULTURE RESULTS: NO GROWTH — SIGNIFICANT CHANGE UP
SPECIMEN SOURCE: SIGNIFICANT CHANGE UP

## 2019-05-01 ENCOUNTER — TRANSCRIPTION ENCOUNTER (OUTPATIENT)
Age: 34
End: 2019-05-01

## 2019-05-02 ENCOUNTER — APPOINTMENT (OUTPATIENT)
Dept: UROGYNECOLOGY | Facility: HOSPITAL | Age: 34
End: 2019-05-02
Payer: COMMERCIAL

## 2019-05-02 ENCOUNTER — OUTPATIENT (OUTPATIENT)
Dept: OUTPATIENT SERVICES | Facility: HOSPITAL | Age: 34
LOS: 1 days | End: 2019-05-02
Payer: COMMERCIAL

## 2019-05-02 VITALS
OXYGEN SATURATION: 100 % | HEART RATE: 62 BPM | WEIGHT: 136.03 LBS | HEIGHT: 66 IN | RESPIRATION RATE: 18 BRPM | SYSTOLIC BLOOD PRESSURE: 125 MMHG | TEMPERATURE: 98 F | DIASTOLIC BLOOD PRESSURE: 80 MMHG

## 2019-05-02 VITALS
HEART RATE: 66 BPM | DIASTOLIC BLOOD PRESSURE: 73 MMHG | RESPIRATION RATE: 16 BRPM | SYSTOLIC BLOOD PRESSURE: 118 MMHG | OXYGEN SATURATION: 100 %

## 2019-05-02 DIAGNOSIS — N89.5 STRICTURE AND ATRESIA OF VAGINA: ICD-10-CM

## 2019-05-02 DIAGNOSIS — K08.409 PARTIAL LOSS OF TEETH, UNSPECIFIED CAUSE, UNSPECIFIED CLASS: Chronic | ICD-10-CM

## 2019-05-02 DIAGNOSIS — Z98.890 OTHER SPECIFIED POSTPROCEDURAL STATES: Chronic | ICD-10-CM

## 2019-05-02 PROCEDURE — 57200 REPAIR OF VAGINA: CPT

## 2019-05-02 RX ORDER — CELECOXIB 200 MG/1
200 CAPSULE ORAL ONCE
Qty: 0 | Refills: 0 | Status: COMPLETED | OUTPATIENT
Start: 2019-05-02 | End: 2019-05-02

## 2019-05-02 RX ORDER — ACETAMINOPHEN 500 MG
1000 TABLET ORAL ONCE
Qty: 0 | Refills: 0 | Status: COMPLETED | OUTPATIENT
Start: 2019-05-02 | End: 2019-05-02

## 2019-05-02 RX ORDER — IBUPROFEN 200 MG
1 TABLET ORAL
Qty: 30 | Refills: 0 | OUTPATIENT
Start: 2019-05-02

## 2019-05-02 RX ORDER — DIAZEPAM 5 MG/1
5 TABLET ORAL
Qty: 20 | Refills: 0 | Status: ACTIVE | COMMUNITY
Start: 2019-05-02 | End: 1900-01-01

## 2019-05-02 RX ORDER — CELECOXIB 200 MG/1
200 CAPSULE ORAL ONCE
Qty: 0 | Refills: 0 | Status: DISCONTINUED | OUTPATIENT
Start: 2019-05-02 | End: 2019-05-17

## 2019-05-02 RX ORDER — OXYCODONE HYDROCHLORIDE 5 MG/1
5 TABLET ORAL ONCE
Qty: 0 | Refills: 0 | Status: DISCONTINUED | OUTPATIENT
Start: 2019-05-02 | End: 2019-05-02

## 2019-05-02 RX ORDER — ONDANSETRON 8 MG/1
4 TABLET, FILM COATED ORAL ONCE
Qty: 0 | Refills: 0 | Status: DISCONTINUED | OUTPATIENT
Start: 2019-05-02 | End: 2019-05-17

## 2019-05-02 RX ORDER — SODIUM CHLORIDE 9 MG/ML
1000 INJECTION, SOLUTION INTRAVENOUS
Qty: 0 | Refills: 0 | Status: DISCONTINUED | OUTPATIENT
Start: 2019-05-02 | End: 2019-05-17

## 2019-05-02 RX ORDER — DIAZEPAM 5 MG
1 TABLET ORAL
Qty: 10 | Refills: 0 | OUTPATIENT
Start: 2019-05-02

## 2019-05-02 RX ORDER — IBUPROFEN 800 MG/1
800 TABLET, FILM COATED ORAL EVERY 8 HOURS
Qty: 30 | Refills: 0 | Status: ACTIVE | COMMUNITY
Start: 2019-05-02 | End: 1900-01-01

## 2019-05-02 RX ADMIN — Medication 1000 MILLIGRAM(S): at 10:18

## 2019-05-02 RX ADMIN — CELECOXIB 200 MILLIGRAM(S): 200 CAPSULE ORAL at 10:18

## 2019-05-02 NOTE — ASU DISCHARGE PLAN (ADULT/PEDIATRIC) - PROCEDURE
colporraphy, revision of vaginal laceration repair colporrhaphy, revision of vaginal laceration repair colporrhapy, revision of vaginal laceration repair

## 2019-05-02 NOTE — ASU DISCHARGE PLAN (ADULT/PEDIATRIC) - NURSING INSTRUCTIONS
Next dose of Tylenol will be on or after ____418pm_______ ,today/tonight, If needed for pain/cramps. Your first dose of Tylenol was given at ____1018am_______. Do not exceed more than 4000mg of Tylenol in one 24 hour setting.

## 2019-05-02 NOTE — ASU DISCHARGE PLAN (ADULT/PEDIATRIC) - CARE PROVIDER_API CALL
Peggy Howell (MD)  Female Pelvic MedReconst Surg; Obstetrics and Gynecology  [address], [address]  [place] [address]  Phone: [phone]  Fax: [phone]  Follow Up Time:

## 2019-05-02 NOTE — BRIEF OPERATIVE NOTE - NSICDXBRIEFPREOP_GEN_ALL_CORE_FT
PRE-OP DIAGNOSIS:  Obstetrical perineal laceration with delivery 02-May-2019 13:05:11  Naomi Melgar  Vaginal scar 02-May-2019 13:04:38  Naomi Melgar

## 2019-05-02 NOTE — BRIEF OPERATIVE NOTE - NSICDXBRIEFPOSTOP_GEN_ALL_CORE_FT
POST-OP DIAGNOSIS:  Obstetrical perineal laceration with delivery 02-May-2019 13:05:18  Naomi Melgar  Vaginal scar 02-May-2019 13:05:24  Naomi Melgar

## 2019-05-02 NOTE — ASU PREOP CHECKLIST - TO WHOM
OR nurse OR nurse Rosy Her OR nurse Rosy Her - received report from Alisia Fang RN and O.R. handoff report to ADRIANO Rao

## 2019-05-02 NOTE — BRIEF OPERATIVE NOTE - NSICDXBRIEFPROCEDURE_GEN_ALL_CORE_FT
PROCEDURES:  Suture repair, vagina, non-obstetrical injury 02-May-2019 13:04:04 Revision of vaginal laceration repair Naomi Melgar

## 2019-05-02 NOTE — ASU DISCHARGE PLAN (ADULT/PEDIATRIC) - CALL YOUR DOCTOR IF YOU HAVE ANY OF THE FOLLOWING:
Nausea and vomiting that does not stop/Bleeding that does not stop/Fever greater than (need to indicate Fahrenheit or Celsius)/Pain not relieved by Medications/Unable to urinate

## 2019-05-02 NOTE — ASU DISCHARGE PLAN (ADULT/PEDIATRIC) - ACTIVITY LEVEL
Nothing per vagina/No tub baths/No douching/No intercourse/No tampons/No heavy lifting/Nothing per rectum

## 2019-05-13 ENCOUNTER — APPOINTMENT (OUTPATIENT)
Dept: UROGYNECOLOGY | Facility: CLINIC | Age: 34
End: 2019-05-13
Payer: COMMERCIAL

## 2019-05-13 DIAGNOSIS — G89.18 OTHER ACUTE POSTPROCEDURAL PAIN: ICD-10-CM

## 2019-05-13 PROCEDURE — 99024 POSTOP FOLLOW-UP VISIT: CPT

## 2019-05-13 NOTE — DISCUSSION/SUMMARY
[Post-Op instructions given. Pt/family verbalizes understanding] : post-operative instructions were provided to the patient/family who verbalize understanding [Risks/Benefits discussed. Pt/family verbalizes understanding] : risks and benefits of the procedure were discussed with the patient/family who verbalize understanding [FreeTextEntry1] : Jayde is 35yo s/p vaginal revision due to scarring of posterior vagina after vaginal delivery and repair. Vaginal pruritus likely due to sutures. No signs of vaginal candidiasis. Will observe for now. She is currently breastfeeding. She was counseled to start topical estrogen starting nightly for 2 weeks then twice a week thereafter. Postoperative restrictions reinforced. She will return in 2 weeks for follow up.

## 2019-05-13 NOTE — SUBJECTIVE
[FreeTextEntry1] : NAD [FreeTextEntry8] : None [FreeTextEntry7] : Mild [FreeTextEntry6] : Normal [FreeTextEntry5] : Normal [FreeTextEntry4] : N [FreeTextEntry3] : Normal [FreeTextEntry9] : Patient reports pruritus at vaginal opening

## 2019-05-13 NOTE — OBJECTIVE
[FreeTextEntry3] : Some stitches at posterior vagina noted to be open. No vaginal discharge. No purulent discharge from wound. No bleeding. No erythema. No signs of infection.

## 2019-05-14 PROBLEM — N89.5 STRICTURE AND ATRESIA OF VAGINA: Chronic | Status: ACTIVE | Noted: 2019-04-29

## 2019-05-28 ENCOUNTER — APPOINTMENT (OUTPATIENT)
Dept: UROGYNECOLOGY | Facility: CLINIC | Age: 34
End: 2019-05-28
Payer: COMMERCIAL

## 2019-05-28 PROCEDURE — 99024 POSTOP FOLLOW-UP VISIT: CPT

## 2019-05-28 PROCEDURE — E004H: CPT

## 2019-05-29 NOTE — DISCUSSION/SUMMARY
[Risks/Benefits discussed. Pt/family verbalizes understanding] : risks and benefits of the procedure were discussed with the patient/family who verbalize understanding [Post-Op instructions given. Pt/family verbalizes understanding] : post-operative instructions were provided to the patient/family who verbalize understanding [FreeTextEntry1] : \par x3+ weeks post op exam s/p 5/2/19 Vaginal revision d/t scarring after vaginal delivery & repair- Benign, healing well.\par Advised Medium vaginal dilator insert with lubricant vaginally 2" daily [id number] to prevent adhesion/fusion  \par Con't Estrace cream PV BIW HS\par Advised may resume exercise as tolerated PRN\par x1 month f/u POA or PRN\par Instructed pt to call the office if any problems or concerns and she verbalized understanding.\par

## 2019-05-29 NOTE — SUBJECTIVE
[FreeTextEntry1] : WN/WD, NAD, A&Ox3, Pt reports as of past 2 days, pain& discomforts has resolved, now only feels intermittent vaginal itching, marbella after applying Estrace cream PV [FreeTextEntry8] : Estrace cream 1g PV QHS s/p x2 weeks yesterday, now will apply BIW HS [FreeTextEntry7] : Vaginal pains resolved 2 days ago. Only c/o vaginal itching around suture site [FreeTextEntry5] : Denies any c/o's [FreeTextEntry6] : Denies any c/o changes [FreeTextEntry4] : Regular daily BM's, denies any constipation [FreeTextEntry3] : Denies any c/o; nl gait [FreeTextEntry9] : Inquiring when she can exercise [FreeTextEntry2] : Denies any c/o changes

## 2019-05-29 NOTE — OBJECTIVE
[Soft and Nontender] : soft and nontender [Good Support] : Good support [Clean, Dry, Intact] : Clean, Dry, Intact [Healing well] : healing well [No Masses or Tenderness] : no masses or tenderness [FreeTextEntry3] : Sutures at inferior forchette & anterior wall intact, no erythema or bleeding, or prurulence, healing well

## 2019-06-25 ENCOUNTER — APPOINTMENT (OUTPATIENT)
Dept: UROGYNECOLOGY | Facility: CLINIC | Age: 34
End: 2019-06-25
Payer: COMMERCIAL

## 2019-06-25 DIAGNOSIS — Z98.890 OTHER SPECIFIED POSTPROCEDURAL STATES: ICD-10-CM

## 2019-06-25 PROCEDURE — 99024 POSTOP FOLLOW-UP VISIT: CPT

## 2019-06-26 NOTE — SUBJECTIVE
[FreeTextEntry1] : good [FreeTextEntry5] : normal [FreeTextEntry9] : Pt has been using Medium vaginal dilator daily and vaginal estrogen as directed. Still reports minimal soreness with dilator but overall significantly better.

## 2019-06-26 NOTE — DISCUSSION/SUMMARY
[FreeTextEntry1] : Pt doing well. Advised to continue vaginal estrogen while breast feeding and dilator daily unless she becomes sexually active.  She will discontinue use once she has stopped breastfeeding. She was advised she resume intercourse when she is comfortable. She will RTO as needed. Pt agrees to call office with any problems/concerns.

## 2019-06-26 NOTE — OBJECTIVE
[Healing well] : healing well [FreeTextEntry3] : sutures dissolved at posterior vagina.. no evidence of infection.

## 2019-12-03 ENCOUNTER — APPOINTMENT (OUTPATIENT)
Dept: NEUROLOGY | Facility: CLINIC | Age: 34
End: 2019-12-03
Payer: COMMERCIAL

## 2019-12-03 DIAGNOSIS — R25.2 CRAMP AND SPASM: ICD-10-CM

## 2019-12-03 DIAGNOSIS — M62.89 OTHER SPECIFIED DISORDERS OF MUSCLE: ICD-10-CM

## 2019-12-03 PROCEDURE — 95885 MUSC TST DONE W/NERV TST LIM: CPT | Mod: 59

## 2019-12-03 PROCEDURE — 99243 OFF/OP CNSLTJ NEW/EST LOW 30: CPT

## 2019-12-03 PROCEDURE — 95909 NRV CNDJ TST 5-6 STUDIES: CPT

## 2019-12-03 NOTE — ASSESSMENT
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 \par Will arrange for genetic testing for myotonic dystrophy type 1, type 2, and non-dystrophic myotonias (ClCN-1 and SCN4A)\par Also check CK and aldolase\par \par See separate procedure note for full results of study.\par

## 2019-12-03 NOTE — CONSULT LETTER
[Consult Letter:] : I had the pleasure of evaluating your patient, [unfilled]. [Dear  ___] : Dear  [unfilled], [Please see my note below.] : Please see my note below. [Sincerely,] : Sincerely, [Consult Closing:] : Thank you very much for allowing me to participate in the care of this patient.  If you have any questions, please do not hesitate to contact me. [FreeTextEntry3] : Brendan Albarran M.D.\par Neurology, Electromyography and Neuromuscular Medicine\par [facility]\par \par  of Neurology\par [facility] / [employer]

## 2019-12-03 NOTE — HISTORY OF PRESENT ILLNESS
[FreeTextEntry1] : Referred by Dr. Bazan for muscle cramps and stiffness\par Started in her teenage years\par Describes difficulty letting go of things in her hands\par Also stiffness in legs when she first gets up\par Symptoms got much worse during pregnancy last year, now have improved\par At that time she felt throat muscles getting tight; that is no longer happening\par \par 13 pt review of systems performed and reviewed with patient (General, Eyes, Ears, Cardiovascular, Respiratory, Gastrointestinal, Genitourinary, Musculoskeletal, Skin, Endocrine, Hematologic, Psychiatric, Neurologic)\par Past medical history, surgical history, social history, and family history reviewed with patient\par See scanned document for details

## 2019-12-03 NOTE — PROCEDURE
[FreeTextEntry1] : Nerve Conduction and Electromyography Report [FreeTextEntry3] : Electro Physiologic Findings:\par \par Limb temperature was monitored and maintained at approximately 30 – 34° C in the lower extremities, and 32 – 36° C in the upper extremities.\par \par The right radial and superficial fibular sensory responses were normal. the right median, fibular and tibial motor responses, including F-wave latencies, were also normal. \par \par Needle electromyography was performed on select right upper and lower extremity proximal and distal appendicular muscles. There was increased insertional activity in every muscle tested. There was mild myotonia in the right abductor pollicis brevis and first dorsal interosseous muscles. The motor units were normal in size and morphology, as well as recruitment pattern. 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 This can be seen in myotonic disorders, including myotonic dystrophy type 1. There was no evidence of polyneuropathy.

## 2019-12-03 NOTE — PHYSICAL EXAM
[FreeTextEntry1] : Gen: appears well, well-nourished, no acute distress\par \par MS: awake, alert, oriented, speech fluent, comprehension intact, good fund of knowledge, recent and remote memory intact, attention intact\par \par CN: PERRL, EOMI, visual fields full, facial strength and sensation intact and symmetric, palate elevation symmetric, tongue midline, no tongue atrophy or fasciculations\par \par Motor: normal bulk and tone, 5/5 strength throughout; possible mild percussion myotonia in thenar eminens but not definite; no  myotonia \par \par Sensory: light touch intact and symmetric throughout\par \par Reflexes: 2+ symmetric throughout, no Benavides’s sign\par \par Coordination: no dysmetria on finger to nose, Romberg negative\par \par Gait: normal

## 2019-12-03 NOTE — CONSULT LETTER
[Dear  ___] : Dear  [unfilled], [Consult Letter:] : I had the pleasure of evaluating your patient, [unfilled]. [Please see my note below.] : Please see my note below. [Sincerely,] : Sincerely, [Consult Closing:] : Thank you very much for allowing me to participate in the care of this patient.  If you have any questions, please do not hesitate to contact me. [FreeTextEntry3] : Brendan Albarran M.D.\par Neurology, Electromyography and Neuromuscular Medicine\par [facility]\par \par  of Neurology\par \[facility]\"" / [employer] [facility] [employer]

## 2023-05-19 NOTE — HISTORY OF PRESENT ILLNESS
[FreeTextEntry1] : Referred by Dr. Bazan for muscle cramps and stiffness\par Started in her teenage years\par Describes difficulty letting go of things in her hands\par Also stiffness in legs when she first gets up\par Symptoms got much worse during pregnancy last year, now have improved\par At that time she felt throat muscles getting tight; that is no longer happening\par \par 13 pt review of systems performed and reviewed with patient (General, Eyes, Ears, Cardiovascular, Respiratory, Gastrointestinal, Genitourinary, Musculoskeletal, Skin, Endocrine, Hematologic, Psychiatric, Neurologic)\par Past medical history, surgical history, social history, and family history reviewed with patient\par See scanned document for details Yes

## 2023-09-05 NOTE — PRE-ANESTHESIA EVALUATION ADULT - BMI (KG/M2)
What Is The Reason For Today's Visit?: Full Body Skin Examination
What Is The Reason For Today's Visit? (Being Monitored For X): concerning skin lesions on an annual basis
22